# Patient Record
Sex: MALE | Race: WHITE
[De-identification: names, ages, dates, MRNs, and addresses within clinical notes are randomized per-mention and may not be internally consistent; named-entity substitution may affect disease eponyms.]

---

## 2019-12-04 ENCOUNTER — HOSPITAL ENCOUNTER (OUTPATIENT)
Dept: HOSPITAL 92 - SCSCT | Age: 39
Discharge: HOME | End: 2019-12-04
Attending: INTERNAL MEDICINE
Payer: COMMERCIAL

## 2019-12-04 DIAGNOSIS — K57.92: Primary | ICD-10-CM

## 2019-12-04 DIAGNOSIS — K57.30: ICD-10-CM

## 2019-12-04 PROCEDURE — 74177 CT ABD & PELVIS W/CONTRAST: CPT

## 2019-12-04 NOTE — CT
CT ABDOMEN AND PELVIS WITH INTRAVENOUS CONTRAST:

 

HISTORY:

Abdominal pain. History of diverticulitis. The patient has been complaining of pain for a few weeks.

 

FINDINGS:

ABDOMEN: The lung bases are clear of any infiltrative process.

 

The liver shows suggestion of some fatty change. The spleen is within normal limits in size. The panc
reas and gallbladder regions appear unremarkable.

 

The right and left adrenal glands and the right and left kidneys are normal in size. There is no sign
ificant periaortic or mesenteric lymphadenopathy.

 

PELVIS: The appendix is normal. Some mild sigmoid diverticulosis but no inflammatory process.

 

IMPRESSION:

1. Mild sigmoid diverticulosis.

 

2. Suggestion of some mild fatty change to the liver.

 

POS: [facility]